# Patient Record
Sex: MALE | Race: WHITE
[De-identification: names, ages, dates, MRNs, and addresses within clinical notes are randomized per-mention and may not be internally consistent; named-entity substitution may affect disease eponyms.]

---

## 2018-06-12 ENCOUNTER — HOSPITAL ENCOUNTER (EMERGENCY)
Dept: HOSPITAL 68 - ERH | Age: 38
End: 2018-06-12
Payer: SELF-PAY

## 2018-06-12 VITALS — DIASTOLIC BLOOD PRESSURE: 75 MMHG | SYSTOLIC BLOOD PRESSURE: 115 MMHG

## 2018-06-12 VITALS — HEIGHT: 72 IN | BODY MASS INDEX: 27.09 KG/M2 | WEIGHT: 200 LBS

## 2018-06-12 DIAGNOSIS — K12.2: Primary | ICD-10-CM

## 2018-06-12 DIAGNOSIS — F17.210: ICD-10-CM

## 2018-06-12 NOTE — ED THROAT/DENTAL COMPLAINT
History of Present Illness
 
General
Chief Complaint: Sore Throat, Dental Pain
Stated Complaint: FEEL LIKE THROAT IS SWOLLEN
Source: patient
Exam Limitations: no limitations
 
Vital Signs & Intake/Output
Vital Signs & Intake/Output
 Vital Signs
 
 
Date Time Temp Pulse Resp B/P B/P Pulse O2 O2 Flow FiO2
 
     Mean Ox Delivery Rate 
 
06/12 1029 98.0 79 18 115/75  100 Room Air  
 
06/12 0828 96.5  18   97 Room Air  
 
 
 
Allergies
Coded Allergies:
No Known Allergies (06/12/18)
 
Reconcile Medications
diphenhydrAMINE HCl (Benadryl) 25 MG CAP   1 CAP PO TID PRN INFLAMMATION
Famotidine (Pepcid) 20 MG TABLET   1 TAB PO DAILY INFLAMMATION
Prednisone 10 MG TABLET   1 TAB PO AD INFLAMMATION
     DAY1/DAY2 FOUR TABS
     DAY3/DAY4 THREE TABS
     DAY 5/DAY6 TWO TABS
     DAY 7 ONE TAB
 
Triage Note:
36 Y/O MALE C/O SWOLLEN UVULA AND TONSILS SINCE
WAKING THIS AM.  DENIES PAIN.  DENIES OTHER
COMPLAINTS.  WAS "FINE" WHEN GOING TO SLEEP LAST
PM.  STREP CULTURE SENT.  +SWELLING NOTED.
Triage Nurses Notes Reviewed? yes
Onset: Abrupt
Duration: constant
Timing: recent history
Injury Environment: home
Severity: moderate
Severity Numbers: 5
HPI:
Patient is a 37-year-old male with unremarkable past medical history since 
emergency room stating that yesterday evening he was in his normal state of 
health patient woke up 3 hours prior to arrival with concerns of swollen throat 
and a uvula that was swollen were patient states "it feels swollen in weird"
Patient denies any sore throat fever chills difficulty breathing or swallowing, 
rash or recent insect bite ear pain tongue swelling lip swelling
Denies any specific allergen exposure
 
(Sourav Toribio)
 
Past History
 
Travel History
Traveled to [place] past 21 day No
 
Medical History
Any Pertinent Medical History? none
Neurological: NONE
EENT: NONE
Cardiovascular: NONE
Respiratory: NONE
Gastrointestinal: NONE
Hepatic: NONE
Renal: NONE
Musculoskeletal: NONE
Psychiatric: NONE
Endocrine: NONE
Blood Disorders: NONE
Cancer(s): NONE
GYN/Reproductive: NONE
 
Surgical History
Surgical History: non-contributory
 
Psychosocial History
What is your primary language English
Tobacco Use: Current Daily Use
Daily Tobacco Use Amount/Type: => 5 Cigarettes daily
 
Family History
Hx Contributory? No
(Sourav Toribio)
 
Review of Systems
 
Review of Systems
Constitutional:
Reports: no symptoms. 
EENTM:
Reports: see HPI. 
Respiratory:
Reports: no symptoms. 
Cardiovascular:
Reports: no symptoms. 
GI:
Reports: no symptoms. 
Genitourinary:
Reports: no symptoms. 
Musculoskeletal:
Reports: no symptoms. 
Skin:
Reports: no symptoms. 
Neurological/Psychological:
Reports: no symptoms. 
Hematologic/Endocrine:
Reports: no symptoms. 
Immunologic/Allergic:
Reports: no symptoms. 
All Other Systems: Reviewed and Negative
(Sourav Toribio)
 
Physical Exam
 
Physical Exam
General Appearance: no apparent distress, alert, comfortable
Head: atraumatic
Eyes:
Bilateral: normal appearance. 
Ears:
Bilateral: canal normal, Tympanic normal. 
Nose: normal inspection
Mouth/Throat: normal mouth inspection, tonsillar swelling, uvula swelling
Neck: normal inspection, no midline tenderness
Cardiovascular/Respiratory: normal breath sounds, normal peripheral pulses, 
regular rate/rhythm
Neurologic/Psych: no motor/sensory deficits, awake, alert, oriented x 3, normal 
gait, normal mood/affect
Skin: intact, normal color, warm/dry
Comments:
No tongue swelling no lip swelling no exudates of pharynx
No lymphadenopathy
No stridor
 
Core Measures
ACS in differential dx? No
Sepsis Present: No
Sepsis Focused Exam Completed? No
(Sourav Toribio)
 
Progress
Differential Diagnosis: carious tooth, epiglottitis, Ludwigs angina, meningitis,
odontogenic abscess, terrie-tonsillar abscess, pharyngeal for. body, stomatitis/
gingivitis, strep pharyngitis, tooth fracture
Plan of Care:
 Orders
 
 
Procedure Date/time Status
 
THROAT CULTURE W/QUICK STREP 06/12 0823 Active
 
 
Patient on initial presentation and exam findings has concerns of uvulitis
Initial rapid strep was negative no exudates noted Centor criteria 0
Strep culture pending
No concerns of All's angina or peritonsillar abscess
 
 
 
0937- patient was reevaluated again resting comfortably bedside no apparent 
distress no respiratory distress clear lungs auscultation no stridor
 
 
1047- patient again was reevaluated that to be resting at bedside clear lungs 
auscultation speaking in clear sentences no respiratory distress
Upon discharge patient looks well no apparent distress and will comply with 
discharge instructions and had no questions
(Sourav Toribio)
 
Departure
 
Departure
Disposition: HOME OR SELF CARE
Condition: Stable
Clinical Impression
Primary Impression: Uvulitis
Referrals:
Cirilo SOUSA,Leyda GARCIA
 
Additional Instructions:
As discussed begin the prednisone prescription tomorrow as you've received this 
in the emergency room today, continue the prescription Pepcid and Benadryl as 
directed.  If symptoms worsen or if YOU develop a running symptom return to 
emergency room, if no better in 2 days follow-up with ENT Dr. Kerr.  
PRESCRIPTIONS waiting at [facility].
Departure Forms:
Customer Survey
General Discharge Information
Prescriptions:
Current Visit Scripts
Prednisone 1 TAB PO AD  
     #19 TAB 
     DAY1/DAY2 FOUR TABS
     DAY3/DAY4 THREE TABS
     DAY 5/DAY6 TWO TABS
     DAY 7 ONE TAB
 
diphenhydrAMINE HCl (Benadryl) 1 CAP PO TID PRN INFLAMMATION 
     #9 CAP 
 
Famotidine (Pepcid) 1 TAB PO DAILY  
     #5 TAB 
 
 
(Sourav Toribio)
 
PA/NP Co-Sign Statement
Statement:
ED Attending supervision documentation-
 
 I saw and evaluated the patient. I have also reviewed all the pertinent lab 
results and diagnostic results. I agree with the findings and the plan of care 
as documented in the PA's/NP's documentation. 
 
x I have reviewed the ED Record and agree with the PA's/NP's documentation.
 
[] Additions or exceptions (if any) to the PAs/NP's note and plan are 
summarized below:
[]
 
(Nandini SOUSA,Mahendra)